# Patient Record
Sex: FEMALE | Race: BLACK OR AFRICAN AMERICAN | NOT HISPANIC OR LATINO | URBAN - NONMETROPOLITAN AREA
[De-identification: names, ages, dates, MRNs, and addresses within clinical notes are randomized per-mention and may not be internally consistent; named-entity substitution may affect disease eponyms.]

---

## 2022-01-26 ENCOUNTER — OTHER- (OUTPATIENT)
Dept: URBAN - NONMETROPOLITAN AREA CLINIC 3 | Facility: CLINIC | Age: 67
Setting detail: DERMATOLOGY
End: 2022-01-26

## 2022-01-26 DIAGNOSIS — C44.622 SQUAMOUS CELL CARCINOMA OF SKIN OF RIGHT UPPER LIMB, INCLUDING SHOULDER: ICD-10-CM

## 2022-01-26 PROCEDURE — 99203 OFFICE O/P NEW LOW 30 MIN: CPT

## 2023-10-30 ENCOUNTER — APPOINTMENT (OUTPATIENT)
Dept: URBAN - NONMETROPOLITAN AREA CLINIC 49 | Age: 68
Setting detail: DERMATOLOGY
End: 2023-10-31

## 2023-10-30 DIAGNOSIS — L81.1 CHLOASMA: ICD-10-CM

## 2023-10-30 PROCEDURE — OTHER PRESCRIPTION: OTHER

## 2023-10-30 PROCEDURE — OTHER COUNSELING: OTHER

## 2023-10-30 PROCEDURE — OTHER SKIN MEDICINALS: OTHER

## 2023-10-30 PROCEDURE — 99214 OFFICE O/P EST MOD 30 MIN: CPT

## 2023-10-30 ASSESSMENT — LOCATION ZONE DERM: LOCATION ZONE: FACE

## 2023-10-30 ASSESSMENT — LOCATION SIMPLE DESCRIPTION DERM
LOCATION SIMPLE: RIGHT CHEEK
LOCATION SIMPLE: LEFT CHEEK

## 2023-10-30 ASSESSMENT — LOCATION DETAILED DESCRIPTION DERM
LOCATION DETAILED: LEFT INFERIOR CENTRAL MALAR CHEEK
LOCATION DETAILED: RIGHT INFERIOR CENTRAL MALAR CHEEK

## 2023-10-30 NOTE — PROCEDURE: [facility]
Sig: Apply a thin layer to the affected skin twice daily
Sig: Apply to affected areas twice daily
Sig: Apply a thin layer to the affected areas twice daily
Sig: Wash affected areas daily.
Sig: Apply a thin layer to the scar daily
Sig: Apply to the affected skin twice daily
Sig: Apply pea sized amount per area at night
Detail Level: Zone
Sig: Take one twice daily
Intro Statement: I recommended the following products:
Sig: Apply a thin layer to the affected nails daily
Product Type (1): Lightening Cream
Sig: Apply to affected areas on face twice daily
Sig: Apply pea sized amount per area at night (only dark spots)
Sig: Apply a thin layer to the areas with decreased hair density 1-2 times daily
Sig: Apply twice daily for 7 days to the scalp, forehead, temples, cheeks, hands and forearms. Treat one area at a time.
Sig: Apply nightly to warts nightly under occlusion
Sig: Use as directed when washing hair
Sig: Take one pill daily
Sig: Apply to affected areas on face once daily in the morning
Sig: Apply twice daily for 5 days
Sig: Take one pill twice daily
Sig: Apply a thin layer to the affected areas daily
Sig: Apply a thin layer to the itching areas twice daily as needed
Lightening Cream: Hydroquinone 8%, Tretinoin 0.1%, Kojic Acid 1%, Niacinamide 4%, Fluocinolone 0.025% Cream

## 2023-10-30 NOTE — PROCEDURE: COUNSELING
Topical Bleaching Agents Recommendations: Vitamin C, Tranexamic acid, Thiamidol
Chemical Peel Recommendations: Glycolic or Jessner
Detail Level: Zone
Topical Retinoids Recommendations: Retinol, prescription retinoids
Sunscreen Recommendations: Tinted mineral sunscreen

## 2024-01-19 ENCOUNTER — APPOINTMENT (OUTPATIENT)
Dept: URBAN - NONMETROPOLITAN AREA CLINIC 49 | Age: 69
Setting detail: DERMATOLOGY
End: 2024-01-20

## 2024-01-19 DIAGNOSIS — L81.1 CHLOASMA: ICD-10-CM

## 2024-01-19 PROCEDURE — OTHER COUNSELING: OTHER

## 2024-01-19 PROCEDURE — OTHER SKIN MEDICINALS: OTHER

## 2024-01-19 PROCEDURE — OTHER PRESCRIPTION MEDICATION MANAGEMENT: OTHER

## 2024-01-19 PROCEDURE — 99214 OFFICE O/P EST MOD 30 MIN: CPT

## 2024-01-19 PROCEDURE — OTHER PRESCRIPTION: OTHER

## 2024-01-19 RX ORDER — DESONIDE 0.5 MG/G
OINTMENT TOPICAL
Qty: 60 | Refills: 0 | Status: ERX | COMMUNITY
Start: 2024-01-19

## 2024-01-19 ASSESSMENT — LOCATION SIMPLE DESCRIPTION DERM
LOCATION SIMPLE: RIGHT CHEEK
LOCATION SIMPLE: LEFT CHEEK

## 2024-01-19 ASSESSMENT — LOCATION DETAILED DESCRIPTION DERM
LOCATION DETAILED: RIGHT INFERIOR CENTRAL MALAR CHEEK
LOCATION DETAILED: LEFT INFERIOR CENTRAL MALAR CHEEK

## 2024-01-19 ASSESSMENT — LOCATION ZONE DERM: LOCATION ZONE: FACE

## 2024-01-19 NOTE — PROCEDURE: PRESCRIPTION MEDICATION MANAGEMENT
Continue Regimen: Sunscreen SPF 50+
Initiate Treatment: Desonide as prescribed for 3 weeks.
Defer Treatment (Provide Reason For Deferment In Text Field Below): oral TXA due to comorbidities
Discontinue Regimen: Bleaching cream temporarily due to irritation
Detail Level: Zone
Plan: Restart bleaching cream at follow up
Render In Strict Bullet Format?: No

## 2024-01-19 NOTE — PROCEDURE: [facility]
Sig: Apply a thin layer to the affected skin twice daily
Sig: Apply to affected areas twice daily
Sig: Apply a thin layer to the affected areas twice daily
Sig: Wash affected areas daily.
Sig: Apply a thin layer to the scar daily
Sig: Apply to the affected skin twice daily
Sig: Apply pea sized amount per area at night
Detail Level: Zone
Sig: Take one twice daily
Intro Statement: I recommended the following products:
Sig: Apply a thin layer to the affected nails daily
Product Type (1): Lightening Cream
Sig: Apply to affected areas on face twice daily
Sig: Apply pea sized amount per area at night (only dark spots)
Sig: Apply a thin layer to the areas with decreased hair density 1-2 times daily
Sig: Apply twice daily for 7 days to the scalp, forehead, temples, cheeks, hands and forearms. Treat one area at a time.
Sig: Apply nightly to warts nightly under occlusion
Sig: Use as directed when washing hair
Sig: Take one pill daily
Sig: Apply to affected areas on face once daily in the morning
Sig: Apply twice daily for 5 days
Sig: Take one pill twice daily
Sig: Apply a thin layer to the affected areas daily
Sig: Apply a thin layer to the itching areas twice daily as needed
Lightening Cream Prescription 1: Hydroquinone 8%, Tretinoin 0.1%, Kojic Acid 1%, Niacinamide 4%, Fluocinolone 0.025% Cream

## 2024-03-08 ENCOUNTER — APPOINTMENT (OUTPATIENT)
Dept: URBAN - NONMETROPOLITAN AREA CLINIC 49 | Age: 69
Setting detail: DERMATOLOGY
End: 2024-03-09

## 2024-03-08 DIAGNOSIS — L81.1 CHLOASMA: ICD-10-CM

## 2024-03-08 PROCEDURE — OTHER COUNSELING: OTHER

## 2024-03-08 PROCEDURE — 99214 OFFICE O/P EST MOD 30 MIN: CPT

## 2024-03-08 PROCEDURE — OTHER PRESCRIPTION MEDICATION MANAGEMENT: OTHER

## 2024-03-08 ASSESSMENT — LOCATION SIMPLE DESCRIPTION DERM
LOCATION SIMPLE: LEFT CHEEK
LOCATION SIMPLE: RIGHT CHEEK

## 2024-03-08 ASSESSMENT — LOCATION ZONE DERM: LOCATION ZONE: FACE

## 2024-03-08 NOTE — PROCEDURE: PRESCRIPTION MEDICATION MANAGEMENT
Modify Regimen: May mix bleaching cream with moisturizer.
Initiate Treatment: Compounding cream once nightly for 2 weeks. Increase to 2 times a week ( nightly) for 2 weeks the increase to 3 times a week ( nightly) for 3 weeks.
Defer Treatment (Provide Reason For Deferment In Text Field Below): oral TXA due to comorbidities
Discontinue Regimen: Sunscreen protection until bleaching cream has been incorporated.
Detail Level: Zone
Plan: Restart bleaching cream at follow up
Render In Strict Bullet Format?: No

## 2024-05-10 ENCOUNTER — APPOINTMENT (OUTPATIENT)
Dept: URBAN - NONMETROPOLITAN AREA CLINIC 49 | Age: 69
Setting detail: DERMATOLOGY
End: 2024-05-10

## 2024-05-10 ENCOUNTER — RX ONLY (RX ONLY)
Age: 69
End: 2024-05-10

## 2024-05-10 DIAGNOSIS — L81.8 OTHER SPECIFIED DISORDERS OF PIGMENTATION: ICD-10-CM

## 2024-05-10 DIAGNOSIS — L93.2 OTHER LOCAL LUPUS ERYTHEMATOSUS: ICD-10-CM

## 2024-05-10 PROBLEM — L30.9 DERMATITIS, UNSPECIFIED: Status: ACTIVE | Noted: 2024-05-10

## 2024-05-10 PROBLEM — L81.9 DISORDER OF PIGMENTATION, UNSPECIFIED: Status: ACTIVE | Noted: 2024-05-10

## 2024-05-10 PROCEDURE — OTHER PRESCRIPTION: OTHER

## 2024-05-10 PROCEDURE — 99214 OFFICE O/P EST MOD 30 MIN: CPT

## 2024-05-10 PROCEDURE — OTHER COUNSELING: OTHER

## 2024-05-10 PROCEDURE — OTHER ORDER TESTS: OTHER

## 2024-05-10 RX ORDER — TRANEXAMIC ACID 650 MG/1
TABLET, FILM COATED ORAL TWICE DAILY
Qty: 30 | Refills: 2 | Status: CANCELLED | COMMUNITY
Start: 2024-05-10

## 2024-05-10 RX ORDER — KOJIC ACID
POWDER (GRAM) MISCELLANEOUS
Qty: 30 | Refills: 2 | Status: ERX | COMMUNITY
Start: 2024-05-10

## 2024-05-10 RX ORDER — TACROLIMUS 1 MG/G
OINTMENT TOPICAL
Qty: 30 | Refills: 2 | Status: ERX | COMMUNITY
Start: 2024-05-10

## 2024-05-10 ASSESSMENT — LOCATION ZONE DERM
LOCATION ZONE: NOSE
LOCATION ZONE: FACE

## 2024-05-10 ASSESSMENT — LOCATION SIMPLE DESCRIPTION DERM
LOCATION SIMPLE: NOSE
LOCATION SIMPLE: LEFT CHEEK
LOCATION SIMPLE: RIGHT CHEEK

## 2024-05-10 ASSESSMENT — LOCATION DETAILED DESCRIPTION DERM
LOCATION DETAILED: LEFT INFERIOR CENTRAL MALAR CHEEK
LOCATION DETAILED: NASAL SUPRATIP
LOCATION DETAILED: RIGHT INFERIOR CENTRAL MALAR CHEEK

## 2024-05-10 ASSESSMENT — SEVERITY ASSESSMENT: SEVERITY: MODERATE

## 2024-05-10 NOTE — PROCEDURE: COUNSELING
Detail Level: Detailed
Patient Specific Counseling (Will Not Stick From Patient To Patient): Patient has some improvement in pigmentation after using bleaching cream, she uses 3 days on and 4 days off. She is using hydroquinone 8-tretinoin 0.1 compound from [facility]. I advised to discontinue for now and start tranexamic acid-kojic acid compound from [facility]. \\nShe cannot take oral TXA due to comorbidities. \\nAdvised sunscreen daily.
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 This improved after topical desonide ointment. Today she has more bright red erythema sparing the NL folds. Mild edema around the eyes. Will screen for lupus but she otherwise denies any other cutaneous findings or systemic symptoms. \\n\\nStart tacrolimus ointment twice daily to face and strict photo protection.

## 2024-06-14 ENCOUNTER — APPOINTMENT (OUTPATIENT)
Dept: URBAN - NONMETROPOLITAN AREA CLINIC 49 | Age: 69
Setting detail: DERMATOLOGY
End: 2024-06-14

## 2024-06-14 ENCOUNTER — RX ONLY (RX ONLY)
Age: 69
End: 2024-06-14

## 2024-06-14 DIAGNOSIS — L81.1 CHLOASMA: ICD-10-CM

## 2024-06-14 PROCEDURE — OTHER PRESCRIPTION MEDICATION MANAGEMENT: OTHER

## 2024-06-14 PROCEDURE — OTHER COUNSELING: OTHER

## 2024-06-14 PROCEDURE — 99214 OFFICE O/P EST MOD 30 MIN: CPT

## 2024-06-14 ASSESSMENT — LOCATION ZONE DERM: LOCATION ZONE: FACE

## 2024-06-14 ASSESSMENT — LOCATION SIMPLE DESCRIPTION DERM
LOCATION SIMPLE: RIGHT CHEEK
LOCATION SIMPLE: LEFT CHEEK

## 2024-06-14 NOTE — PROCEDURE: COUNSELING
Topical Bleaching Agents Recommendations: Vitamin C, Tranexamic acid, Thiamidol
Chemical Peel Recommendations: Glycolic or Jessner
Patient Specific Counseling (Will Not Stick From Patient To Patient): Allowed patient to resume topical medication from [facility]\\nRecommended 3 months on 1 month off\\nReviewed risks and benefits\\nWill reassess in 3 months
Detail Level: Zone
Topical Retinoids Recommendations: Retinol, prescription retinoids
Sunscreen Recommendations: Tinted mineral sunscreen

## 2024-06-14 NOTE — PROCEDURE: PRESCRIPTION MEDICATION MANAGEMENT
Modify Regimen: May mix bleaching cream with moisturizer.
Continue Regimen: bleaching cream
Defer Treatment (Provide Reason For Deferment In Text Field Below): oral TXA due to comorbidities
Detail Level: Zone
Plan: Compounding cream once nightly for 2 weeks. Increase to 2 times a week ( nightly) for 2 weeks the increase to 3 times a week ( nightly) for 3 weeks.
Render In Strict Bullet Format?: No

## 2024-08-09 ENCOUNTER — APPOINTMENT (OUTPATIENT)
Dept: URBAN - NONMETROPOLITAN AREA CLINIC 49 | Age: 69
Setting detail: DERMATOLOGY
End: 2024-08-09

## 2024-08-09 DIAGNOSIS — L81.1 CHLOASMA: ICD-10-CM

## 2024-08-09 PROCEDURE — 99214 OFFICE O/P EST MOD 30 MIN: CPT

## 2024-08-09 PROCEDURE — OTHER COUNSELING: OTHER

## 2024-08-09 PROCEDURE — OTHER PRESCRIPTION MEDICATION MANAGEMENT: OTHER

## 2024-08-09 ASSESSMENT — LOCATION SIMPLE DESCRIPTION DERM
LOCATION SIMPLE: LEFT CHEEK
LOCATION SIMPLE: RIGHT CHEEK

## 2024-08-09 ASSESSMENT — LOCATION ZONE DERM: LOCATION ZONE: FACE

## 2024-08-09 NOTE — PROCEDURE: PRESCRIPTION MEDICATION MANAGEMENT
Modify Regimen: May mix bleaching cream with moisturizer.
Continue Regimen: bleaching cream as directed above in counseling.
Defer Treatment (Provide Reason For Deferment In Text Field Below): oral TXA due to comorbidities
Detail Level: Zone
Plan: Compounding cream once nightly for 2 weeks. Increase to 2 times a week ( nightly) for 2 weeks the increase to 3 times a week ( nightly) for 3 weeks.
Render In Strict Bullet Format?: No

## 2024-08-09 NOTE — PROCEDURE: COUNSELING
Topical Bleaching Agents Recommendations: Vitamin C, Tranexamic acid, Thiamidol
Chemical Peel Recommendations: Glycolic or Jessner
Patient Specific Counseling (Will Not Stick From Patient To Patient): Patient overall has significant improvement of her pigmentation but persistent areas of erythema. We have improved this with periods of breaks from the hydroquinone. She has focally the darkest areas at the malar creases under the eyes on the bilateral cheeks. 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 Photos were compared and patient is clinically much improved from prior photo. She has had this recalcitrant area for several years. She feels that the area is improved and she's incredibly happy with her results. I also feel that her erythema is from the bleaching agent, however, she did discontinue this at previous visits and only used desonide ointment but had persistent erythema. She states it is from her sunscreen which we advised her to switch. We checked an MAXIME which was negative. \\nPatient would like to continue since she's seeing progress. Advised to use triple bleaching cream (tretinoin 0.1-hydroquinone 8-fluocinolone 0.025%) for max 3 months with 4 week break. She has been mixing it with her moisturizer to decrease the strength. I advised to make the ratio with higher moisturizer to bleaching cream content. She demonstrated understanding. Discussed risk for permanent darkening if used inappropriately. \\nFollow up in 2-3 months.
Detail Level: Zone
Topical Retinoids Recommendations: Retinol, prescription retinoids
Sunscreen Recommendations: Tinted mineral sunscreen

## 2024-11-08 ENCOUNTER — APPOINTMENT (OUTPATIENT)
Dept: URBAN - NONMETROPOLITAN AREA CLINIC 49 | Age: 69
Setting detail: DERMATOLOGY
End: 2024-11-08

## 2024-11-08 DIAGNOSIS — L81.1 CHLOASMA: ICD-10-CM

## 2024-11-08 PROCEDURE — OTHER COUNSELING: OTHER

## 2024-11-08 PROCEDURE — 99212 OFFICE O/P EST SF 10 MIN: CPT

## 2024-11-08 PROCEDURE — OTHER ADDITIONAL NOTES: OTHER

## 2024-11-08 PROCEDURE — OTHER MIPS QUALITY: OTHER

## 2024-11-08 ASSESSMENT — LOCATION ZONE DERM: LOCATION ZONE: FACE

## 2024-11-08 ASSESSMENT — LOCATION SIMPLE DESCRIPTION DERM: LOCATION SIMPLE: LEFT CHEEK

## 2024-11-08 ASSESSMENT — LOCATION DETAILED DESCRIPTION DERM: LOCATION DETAILED: LEFT INFERIOR MEDIAL MALAR CHEEK

## 2024-11-08 NOTE — PROCEDURE: ADDITIONAL NOTES
Detail Level: Zone
Render Risk Assessment In Note?: no
Additional Notes: Asked Dr Guillaume to rf since I'm unsure of compound ingredients

## 2025-02-13 ENCOUNTER — APPOINTMENT (OUTPATIENT)
Dept: URBAN - NONMETROPOLITAN AREA CLINIC 49 | Age: 70
Setting detail: DERMATOLOGY
End: 2025-02-13

## 2025-05-19 ENCOUNTER — APPOINTMENT (OUTPATIENT)
Dept: URBAN - NONMETROPOLITAN AREA CLINIC 49 | Age: 70
Setting detail: DERMATOLOGY
End: 2025-05-20

## 2025-05-19 DIAGNOSIS — L81.1 CHLOASMA: ICD-10-CM

## 2025-05-19 DIAGNOSIS — L81.9 DISORDER OF PIGMENTATION, UNSPECIFIED: ICD-10-CM

## 2025-05-19 PROCEDURE — OTHER PHOTO-DOCUMENTATION: OTHER

## 2025-05-19 PROCEDURE — OTHER MIPS QUALITY: OTHER

## 2025-05-19 PROCEDURE — OTHER COUNSELING: OTHER

## 2025-05-19 PROCEDURE — OTHER PRESCRIPTION: OTHER

## 2025-05-19 PROCEDURE — OTHER TREATMENT REGIMEN: OTHER

## 2025-05-19 PROCEDURE — 99213 OFFICE O/P EST LOW 20 MIN: CPT

## 2025-05-19 RX ORDER — HYDROQUINONE 40 MG/G
CREAM TOPICAL
Qty: 28.35 | Refills: 3 | Status: ERX | COMMUNITY
Start: 2025-05-19

## 2025-05-19 ASSESSMENT — LOCATION ZONE DERM
LOCATION ZONE: HAND
LOCATION ZONE: FACE

## 2025-05-19 ASSESSMENT — LOCATION SIMPLE DESCRIPTION DERM
LOCATION SIMPLE: LEFT HAND
LOCATION SIMPLE: LEFT CHEEK

## 2025-05-19 ASSESSMENT — LOCATION DETAILED DESCRIPTION DERM
LOCATION DETAILED: LEFT RADIAL DORSAL HAND
LOCATION DETAILED: LEFT CENTRAL MALAR CHEEK